# Patient Record
Sex: FEMALE | Race: BLACK OR AFRICAN AMERICAN | NOT HISPANIC OR LATINO | URBAN - METROPOLITAN AREA
[De-identification: names, ages, dates, MRNs, and addresses within clinical notes are randomized per-mention and may not be internally consistent; named-entity substitution may affect disease eponyms.]

---

## 2020-12-07 ENCOUNTER — CLINICAL SUPPORT (OUTPATIENT)
Dept: SMOKING CESSATION | Facility: CLINIC | Age: 50
End: 2020-12-07
Attending: SURGERY
Payer: COMMERCIAL

## 2020-12-07 DIAGNOSIS — F17.200 NICOTINE DEPENDENCE: Primary | ICD-10-CM

## 2020-12-07 PROCEDURE — 99404 PREV MED CNSL INDIV APPRX 60: CPT | Mod: S$GLB,,, | Performed by: GENERAL PRACTICE

## 2020-12-07 PROCEDURE — 99404 PR PREVENT COUNSEL,INDIV,60 MIN: ICD-10-PCS | Mod: S$GLB,,, | Performed by: GENERAL PRACTICE

## 2020-12-08 RX ORDER — AMLODIPINE BESYLATE 2.5 MG/1
2.5 TABLET ORAL DAILY
COMMUNITY

## 2020-12-08 RX ORDER — IBUPROFEN 200 MG
1 TABLET ORAL DAILY
Qty: 14 PATCH | Refills: 1 | Status: SHIPPED | OUTPATIENT
Start: 2020-12-08 | End: 2020-12-22 | Stop reason: SDUPTHER

## 2020-12-08 RX ORDER — DM/P-EPHED/ACETAMINOPH/DOXYLAM 30-7.5/3
2 LIQUID (ML) ORAL
Qty: 216 LOZENGE | Refills: 0 | Status: SHIPPED | OUTPATIENT
Start: 2020-12-08 | End: 2021-01-07

## 2020-12-15 ENCOUNTER — CLINICAL SUPPORT (OUTPATIENT)
Dept: SMOKING CESSATION | Facility: CLINIC | Age: 50
End: 2020-12-15
Payer: COMMERCIAL

## 2020-12-15 DIAGNOSIS — F17.200 NICOTINE DEPENDENCE: Primary | ICD-10-CM

## 2020-12-15 PROCEDURE — 99402 PREV MED CNSL INDIV APPRX 30: CPT | Mod: S$GLB,,, | Performed by: GENERAL PRACTICE

## 2020-12-15 PROCEDURE — 99402 PR PREVENT COUNSEL,INDIV,30 MIN: ICD-10-PCS | Mod: S$GLB,,, | Performed by: GENERAL PRACTICE

## 2020-12-15 NOTE — Clinical Note
Spoke with patient at length regarding tobacco cessation progress. Patient remains on prescribed tobacco cessation medication regimen of 21 mg patch and 2mg lozenges without any negative side effects at this time. Patient stated that she smokes  one cigarette a day and that because her fiance friend gives it to her. Reviewed strategies, cues, and triggers. Introduced the negative impact of tobacco on health, the health advantages of discontinuing the use of tobacco, time line improved health changes after a quit, withdrawal issues to expect from nicotine and habit, and ways to achieve the goal of a quit. Triggers are coffee, after eating and alcohol. The patient will continue with group therapy sessions and medication monitoring by CTTS. Prescribed medication management will be by physician. The patient denies any abnormal behavioral or mental changes at this time.

## 2020-12-15 NOTE — PROGRESS NOTES
Individual Follow-Up Form    12/15/2020    Quit Date: none    Clinical Status of Patient: Outpatient    Length of Service: 30 minutes    Continuing Medication: yes  Patches    Other Medications: 2mg lozenges     Target Symptoms: Withdrawal and medication side effects. The following were rated moderate (3) to severe (4) on TCRS:  · Moderate (3): none  · Severe (4): none    Comments: Spoke with patient at length regarding tobacco cessation progress. Patient remains on prescribed tobacco cessation medication regimen of 21 mg patch and 2mg lozenges without any negative side effects at this time. Patient stated that she smokes  one cigarette a day and thats because her fiance friend gives it to her. Reviewed strategies, cues, and triggers. Introduced the negative impact of tobacco on health, the health advantages of discontinuing the use of tobacco, time line improved health changes after a quit, withdrawal issues to expect from nicotine and habit, and ways to achieve the goal of a quit. Triggers are coffee, after eating and alcohol. The patient will continue with group therapy sessions and medication monitoring by CTTS. Prescribed medication management will be by physician. The patient denies any abnormal behavioral or mental changes at this time.       Diagnosis: F17.210    Next Visit: 1 week

## 2020-12-22 ENCOUNTER — CLINICAL SUPPORT (OUTPATIENT)
Dept: SMOKING CESSATION | Facility: CLINIC | Age: 50
End: 2020-12-22
Payer: COMMERCIAL

## 2020-12-22 DIAGNOSIS — F17.200 NICOTINE DEPENDENCE: ICD-10-CM

## 2020-12-22 DIAGNOSIS — Z72.0 TOBACCO ABUSE: ICD-10-CM

## 2020-12-22 PROCEDURE — 99402 PR PREVENT COUNSEL,INDIV,30 MIN: ICD-10-PCS | Mod: S$GLB,,, | Performed by: GENERAL PRACTICE

## 2020-12-22 PROCEDURE — 99402 PREV MED CNSL INDIV APPRX 30: CPT | Mod: S$GLB,,, | Performed by: GENERAL PRACTICE

## 2020-12-22 RX ORDER — IBUPROFEN 200 MG
1 TABLET ORAL DAILY
Qty: 14 PATCH | Refills: 1 | Status: SHIPPED | OUTPATIENT
Start: 2020-12-22 | End: 2021-01-19

## 2020-12-22 NOTE — PROGRESS NOTES
Individual Follow-Up Form    12/22/2020    Quit Date: 12/19/20     Clinical Status of Patient: Outpatient    Length of Service: 30 minutes    Continuing Medication: yes  Patches    Other Medications: 2mg lozenges     Target Symptoms: Withdrawal and medication side effects. The following were  rated moderate (3) to severe (4) on TCRS:  · Moderate (3): none  · Severe (4): none    Comments: Spoke with patient at length via phone. Patient remains on prescribed tobacco cessation medication regimen of 21 mg patch and 2mg lozenges without any negative side effects at this time. Patient informed me that she has not smoked since Friday December 18th. Her quit date is Saturday December 19 th. She is super proud of herself. I commend her on not smoking. Patient stated that she quit drinking alcohol because that was a strong trigger. Reviewed strategies, habitual behavior, stress, and high risk situations. Introduced stress with addition interventions, SOLVE, relaxation with interventions, nutrition, exercise, weight gain, and the importance of rewarding oneself for accomplishments toward becoming tobacco free. Open discussion of all items with interventions. The patient will continue with individual therapy sessions and medication monitoring by CTTS. Prescribed medication management will be by physician. The patient denies any abnormal behavioral or mental changes at this time.         Diagnosis: F17.210    Next Visit: 1 week

## 2020-12-22 NOTE — Clinical Note
Spoke with patient at length via phone. Patient remains on prescribed tobacco cessation medication regimen of 21 mg patch and 2mg lozenges without any negative side effects at this time. Patient informed me that she has not smoked since Friday December 18th. Her quit date is Saturday December 19 th. She is super proud of herself. I commend her on not smoking. Patient stated that she quit drinking alcohol because that was a strong trigger. Reviewed strategies, habitual behavior, stress, and high risk situations. Introduced stress with addition interventions, SOLVE, relaxation with interventions, nutrition, exercise, weight gain, and the importance of rewarding oneself for accomplishments toward becoming tobacco free. Open discussion of all items with interventions. The patient will continue with individual therapy sessions and medication monitoring by CTTS. Prescribed medication management will be by physician. The patient denies any abnormal behavioral or mental changes at this time.

## 2020-12-30 ENCOUNTER — CLINICAL SUPPORT (OUTPATIENT)
Dept: SMOKING CESSATION | Facility: CLINIC | Age: 50
End: 2020-12-30
Payer: COMMERCIAL

## 2020-12-30 DIAGNOSIS — F17.200 NICOTINE DEPENDENCE: Primary | ICD-10-CM

## 2020-12-30 PROCEDURE — 99402 PREV MED CNSL INDIV APPRX 30: CPT | Mod: S$GLB,,, | Performed by: GENERAL PRACTICE

## 2020-12-30 PROCEDURE — 99402 PR PREVENT COUNSEL,INDIV,30 MIN: ICD-10-PCS | Mod: S$GLB,,, | Performed by: GENERAL PRACTICE

## 2020-12-30 NOTE — Clinical Note
Spoke with patient at length via phone regarding tobacco cessation progress. Patient remains on prescribed tobacco cessation medication regimen of 21 mg patch and 2mg lozenge without any negative side effects at this time. Patient remains tobacco free since 12/19/2020. The patient is super proud and taking many precautions to not slip and relapse. She runs and gets herself out of high risks situations. She hopes to reward herself with new running shoes soon. Reviewed strategies, habitual behavior, high risks situations, understanding urges and cravings, stress and relaxation with open discussion and additional interventions, Introduced lapses, relapses, understanding them and analyzing the situation of a lapse, conflict issues that may be linked to a lapse. The patient will continue with individual therapy sessions and medication monitoring by CTTS. Prescribed medication management will be by physician. The patient denies any abnormal behavioral or mental changes at this time.

## 2020-12-30 NOTE — PROGRESS NOTES
Individual Follow-Up Form    12/30/2020    Quit Date: 12/19/2020    Clinical Status of Patient: Outpatient    Length of Service: 30 minutes    Continuing Medication: yes  Patches or Nicotine gum    Other Medications: none     Target Symptoms: Withdrawal and medication side effects. The following were  rated moderate (3) to severe (4) on TCRS:  · Moderate (3): none  · Severe (4): none    Comments: Spoke with patient at length via phone regarding tobacco cessation progress. Patient remains on prescribed tobacco cessation medication regimen of 21 mg patch and 2mg lozenge without any negative side effects at this time. Patient remains tobacco free since 12/19/2020. The patient is super proud and taking many precautions to not slip and relapse. She runs and gets herself out of high risks situations and has a cat that she gives a lot of attention to. She also mentioned that she loves the way her clothes smell now that she does not smoke. She hopes to reward herself with new running shoes soon. Reviewed strategies, habitual behavior, high risks situations, understanding urges and cravings, stress and relaxation with open discussion and additional interventions, Introduced lapses, relapses, understanding them and analyzing the situation of a lapse, conflict issues that may be linked to a lapse. The patient will continue with individual therapy sessions and medication monitoring by CTTS. Prescribed medication management will be by physician. The patient denies any abnormal behavioral or mental changes at this time.         Diagnosis: F17.210    Next Visit: 2 weeks

## 2021-01-20 ENCOUNTER — TELEPHONE (OUTPATIENT)
Dept: SMOKING CESSATION | Facility: CLINIC | Age: 51
End: 2021-01-20

## 2021-01-21 ENCOUNTER — TELEPHONE (OUTPATIENT)
Dept: SMOKING CESSATION | Facility: CLINIC | Age: 51
End: 2021-01-21

## 2021-01-21 ENCOUNTER — CLINICAL SUPPORT (OUTPATIENT)
Dept: SMOKING CESSATION | Facility: CLINIC | Age: 51
End: 2021-01-21
Payer: COMMERCIAL

## 2021-01-21 DIAGNOSIS — F17.200 NICOTINE DEPENDENCE: Primary | ICD-10-CM

## 2021-01-21 PROCEDURE — 99402 PR PREVENT COUNSEL,INDIV,30 MIN: ICD-10-PCS | Mod: S$GLB,,, | Performed by: GENERAL PRACTICE

## 2021-01-21 PROCEDURE — 99402 PREV MED CNSL INDIV APPRX 30: CPT | Mod: S$GLB,,, | Performed by: GENERAL PRACTICE
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
## 2021-01-22 PROBLEM — S02.2XXD CLOSED FRACTURE OF NASAL BONE WITH ROUTINE HEALING: Status: ACTIVE | Noted: 2021-01-22

## 2021-01-22 PROBLEM — S01.81XA LACERATION OF FACE: Status: ACTIVE | Noted: 2021-01-22

## 2021-02-11 ENCOUNTER — CLINICAL SUPPORT (OUTPATIENT)
Dept: SMOKING CESSATION | Facility: CLINIC | Age: 51
End: 2021-02-11
Payer: COMMERCIAL

## 2021-02-11 DIAGNOSIS — F17.200 NICOTINE DEPENDENCE: Primary | ICD-10-CM

## 2021-02-11 PROCEDURE — 99402 PR PREVENT COUNSEL,INDIV,30 MIN: ICD-10-PCS | Mod: S$GLB,,, | Performed by: GENERAL PRACTICE

## 2021-02-11 PROCEDURE — 99402 PREV MED CNSL INDIV APPRX 30: CPT | Mod: S$GLB,,, | Performed by: GENERAL PRACTICE

## 2021-03-04 ENCOUNTER — TELEPHONE (OUTPATIENT)
Dept: SMOKING CESSATION | Facility: CLINIC | Age: 51
End: 2021-03-04

## 2021-03-09 ENCOUNTER — TELEPHONE (OUTPATIENT)
Dept: SMOKING CESSATION | Facility: CLINIC | Age: 51
End: 2021-03-09

## 2021-03-10 ENCOUNTER — TELEPHONE (OUTPATIENT)
Dept: SMOKING CESSATION | Facility: CLINIC | Age: 51
End: 2021-03-10

## 2021-03-11 ENCOUNTER — CLINICAL SUPPORT (OUTPATIENT)
Dept: SMOKING CESSATION | Facility: CLINIC | Age: 51
End: 2021-03-11
Payer: COMMERCIAL

## 2021-03-11 ENCOUNTER — TELEPHONE (OUTPATIENT)
Dept: SMOKING CESSATION | Facility: CLINIC | Age: 51
End: 2021-03-11

## 2021-03-11 DIAGNOSIS — F17.200 NICOTINE DEPENDENCE: Primary | ICD-10-CM

## 2021-03-11 PROCEDURE — 99402 PR PREVENT COUNSEL,INDIV,30 MIN: ICD-10-PCS | Mod: S$GLB,,, | Performed by: GENERAL PRACTICE

## 2021-03-11 PROCEDURE — 99402 PREV MED CNSL INDIV APPRX 30: CPT | Mod: S$GLB,,, | Performed by: GENERAL PRACTICE

## 2021-04-01 ENCOUNTER — CLINICAL SUPPORT (OUTPATIENT)
Dept: SMOKING CESSATION | Facility: CLINIC | Age: 51
End: 2021-04-01
Payer: COMMERCIAL

## 2021-04-01 DIAGNOSIS — F17.200 NICOTINE DEPENDENCE: Primary | ICD-10-CM

## 2021-04-01 PROCEDURE — 99402 PREV MED CNSL INDIV APPRX 30: CPT | Mod: S$GLB,,, | Performed by: GENERAL PRACTICE

## 2021-04-01 PROCEDURE — 99402 PR PREVENT COUNSEL,INDIV,30 MIN: ICD-10-PCS | Mod: S$GLB,,, | Performed by: GENERAL PRACTICE

## 2021-04-01 RX ORDER — DM/P-EPHED/ACETAMINOPH/DOXYLAM 30-7.5/3
2 LIQUID (ML) ORAL
Qty: 270 LOZENGE | Refills: 0 | Status: SHIPPED | OUTPATIENT
Start: 2021-04-01 | End: 2021-07-21 | Stop reason: SDUPTHER

## 2021-04-22 ENCOUNTER — TELEPHONE (OUTPATIENT)
Dept: SMOKING CESSATION | Facility: CLINIC | Age: 51
End: 2021-04-22

## 2021-04-28 ENCOUNTER — CLINICAL SUPPORT (OUTPATIENT)
Dept: SMOKING CESSATION | Facility: CLINIC | Age: 51
End: 2021-04-28
Payer: COMMERCIAL

## 2021-04-28 ENCOUNTER — TELEPHONE (OUTPATIENT)
Dept: SMOKING CESSATION | Facility: CLINIC | Age: 51
End: 2021-04-28

## 2021-04-28 DIAGNOSIS — F17.200 NICOTINE DEPENDENCE: Primary | ICD-10-CM

## 2021-04-28 PROCEDURE — 99401 PREV MED CNSL INDIV APPRX 15: CPT | Mod: S$GLB,,, | Performed by: GENERAL PRACTICE

## 2021-04-28 PROCEDURE — 99401 PR PREVENT COUNSEL,INDIV,15 MIN: ICD-10-PCS | Mod: S$GLB,,, | Performed by: GENERAL PRACTICE

## 2021-05-26 ENCOUNTER — CLINICAL SUPPORT (OUTPATIENT)
Dept: SMOKING CESSATION | Facility: CLINIC | Age: 51
End: 2021-05-26
Payer: COMMERCIAL

## 2021-05-26 ENCOUNTER — TELEPHONE (OUTPATIENT)
Dept: SMOKING CESSATION | Facility: CLINIC | Age: 51
End: 2021-05-26

## 2021-05-26 DIAGNOSIS — F17.200 NICOTINE DEPENDENCE: Primary | ICD-10-CM

## 2021-05-26 PROCEDURE — 99402 PREV MED CNSL INDIV APPRX 30: CPT | Mod: S$GLB,,, | Performed by: GENERAL PRACTICE

## 2021-05-26 PROCEDURE — 99402 PR PREVENT COUNSEL,INDIV,30 MIN: ICD-10-PCS | Mod: S$GLB,,, | Performed by: GENERAL PRACTICE

## 2021-05-26 RX ORDER — IBUPROFEN 200 MG
1 TABLET ORAL DAILY
Qty: 14 PATCH | Refills: 0 | Status: SHIPPED | OUTPATIENT
Start: 2021-05-26 | End: 2022-01-05

## 2021-06-02 ENCOUNTER — TELEPHONE (OUTPATIENT)
Dept: SMOKING CESSATION | Facility: CLINIC | Age: 51
End: 2021-06-02

## 2021-06-08 ENCOUNTER — TELEPHONE (OUTPATIENT)
Dept: SMOKING CESSATION | Facility: CLINIC | Age: 51
End: 2021-06-08

## 2021-06-15 ENCOUNTER — TELEPHONE (OUTPATIENT)
Dept: SMOKING CESSATION | Facility: CLINIC | Age: 51
End: 2021-06-15

## 2021-06-15 ENCOUNTER — CLINICAL SUPPORT (OUTPATIENT)
Dept: SMOKING CESSATION | Facility: CLINIC | Age: 51
End: 2021-06-15
Payer: COMMERCIAL

## 2021-06-15 DIAGNOSIS — F17.200 NICOTINE DEPENDENCE: Primary | ICD-10-CM

## 2021-06-16 ENCOUNTER — CLINICAL SUPPORT (OUTPATIENT)
Dept: SMOKING CESSATION | Facility: CLINIC | Age: 51
End: 2021-06-16
Payer: COMMERCIAL

## 2021-06-16 DIAGNOSIS — F17.200 NICOTINE DEPENDENCE: Primary | ICD-10-CM

## 2021-06-16 PROCEDURE — 99402 PR PREVENT COUNSEL,INDIV,30 MIN: ICD-10-PCS | Mod: S$GLB,,, | Performed by: GENERAL PRACTICE

## 2021-06-16 PROCEDURE — 99402 PREV MED CNSL INDIV APPRX 30: CPT | Mod: S$GLB,,, | Performed by: GENERAL PRACTICE

## 2021-06-30 ENCOUNTER — CLINICAL SUPPORT (OUTPATIENT)
Dept: SMOKING CESSATION | Facility: CLINIC | Age: 51
End: 2021-06-30
Payer: COMMERCIAL

## 2021-06-30 ENCOUNTER — TELEPHONE (OUTPATIENT)
Dept: SMOKING CESSATION | Facility: CLINIC | Age: 51
End: 2021-06-30

## 2021-06-30 DIAGNOSIS — F17.200 NICOTINE DEPENDENCE: Primary | ICD-10-CM

## 2021-06-30 PROCEDURE — 99402 PREV MED CNSL INDIV APPRX 30: CPT | Mod: S$GLB,,, | Performed by: GENERAL PRACTICE

## 2021-06-30 PROCEDURE — 99402 PR PREVENT COUNSEL,INDIV,30 MIN: ICD-10-PCS | Mod: S$GLB,,, | Performed by: GENERAL PRACTICE

## 2021-07-01 ENCOUNTER — PATIENT MESSAGE (OUTPATIENT)
Dept: ADMINISTRATIVE | Facility: OTHER | Age: 51
End: 2021-07-01

## 2021-07-15 ENCOUNTER — TELEPHONE (OUTPATIENT)
Dept: SMOKING CESSATION | Facility: CLINIC | Age: 51
End: 2021-07-15

## 2021-07-21 ENCOUNTER — CLINICAL SUPPORT (OUTPATIENT)
Dept: SMOKING CESSATION | Facility: CLINIC | Age: 51
End: 2021-07-21
Payer: COMMERCIAL

## 2021-07-21 ENCOUNTER — TELEPHONE (OUTPATIENT)
Dept: SMOKING CESSATION | Facility: CLINIC | Age: 51
End: 2021-07-21

## 2021-07-21 DIAGNOSIS — F17.200 NICOTINE DEPENDENCE: ICD-10-CM

## 2021-07-21 PROCEDURE — 99402 PREV MED CNSL INDIV APPRX 30: CPT | Mod: S$GLB,,, | Performed by: GENERAL PRACTICE

## 2021-07-21 PROCEDURE — 99402 PR PREVENT COUNSEL,INDIV,30 MIN: ICD-10-PCS | Mod: S$GLB,,, | Performed by: GENERAL PRACTICE

## 2021-07-21 RX ORDER — DM/P-EPHED/ACETAMINOPH/DOXYLAM 30-7.5/3
2 LIQUID (ML) ORAL
Qty: 270 LOZENGE | Refills: 0 | Status: SHIPPED | OUTPATIENT
Start: 2021-07-21 | End: 2022-01-05 | Stop reason: SDUPTHER

## 2021-07-29 ENCOUNTER — TELEPHONE (OUTPATIENT)
Dept: SMOKING CESSATION | Facility: CLINIC | Age: 51
End: 2021-07-29

## 2021-08-04 ENCOUNTER — TELEPHONE (OUTPATIENT)
Dept: SMOKING CESSATION | Facility: CLINIC | Age: 51
End: 2021-08-04

## 2021-08-04 ENCOUNTER — CLINICAL SUPPORT (OUTPATIENT)
Dept: SMOKING CESSATION | Facility: CLINIC | Age: 51
End: 2021-08-04
Payer: COMMERCIAL

## 2021-08-04 DIAGNOSIS — F17.200 NICOTINE DEPENDENCE: Primary | ICD-10-CM

## 2021-08-04 PROCEDURE — 99402 PR PREVENT COUNSEL,INDIV,30 MIN: ICD-10-PCS | Mod: S$GLB,,, | Performed by: GENERAL PRACTICE

## 2021-08-04 PROCEDURE — 99402 PREV MED CNSL INDIV APPRX 30: CPT | Mod: S$GLB,,, | Performed by: GENERAL PRACTICE

## 2021-08-18 ENCOUNTER — CLINICAL SUPPORT (OUTPATIENT)
Dept: SMOKING CESSATION | Facility: CLINIC | Age: 51
End: 2021-08-18
Payer: COMMERCIAL

## 2021-08-18 DIAGNOSIS — F17.200 NICOTINE DEPENDENCE: Primary | ICD-10-CM

## 2021-08-18 PROCEDURE — 99402 PREV MED CNSL INDIV APPRX 30: CPT | Mod: S$GLB,,, | Performed by: GENERAL PRACTICE

## 2021-08-18 PROCEDURE — 99402 PR PREVENT COUNSEL,INDIV,30 MIN: ICD-10-PCS | Mod: S$GLB,,, | Performed by: GENERAL PRACTICE

## 2021-12-23 ENCOUNTER — CLINICAL SUPPORT (OUTPATIENT)
Dept: SMOKING CESSATION | Facility: CLINIC | Age: 51
End: 2021-12-23
Payer: COMMERCIAL

## 2021-12-23 DIAGNOSIS — F17.200 NICOTINE DEPENDENCE: Primary | ICD-10-CM

## 2021-12-23 PROCEDURE — 99407 BEHAV CHNG SMOKING > 10 MIN: CPT | Mod: S$GLB,,,

## 2021-12-23 PROCEDURE — 99407 PR TOBACCO USE CESSATION INTENSIVE >10 MINUTES: ICD-10-PCS | Mod: S$GLB,,,

## 2022-01-05 ENCOUNTER — CLINICAL SUPPORT (OUTPATIENT)
Dept: SMOKING CESSATION | Facility: CLINIC | Age: 52
End: 2022-01-05
Payer: COMMERCIAL

## 2022-01-05 ENCOUNTER — TELEPHONE (OUTPATIENT)
Dept: SMOKING CESSATION | Facility: CLINIC | Age: 52
End: 2022-01-05

## 2022-01-05 DIAGNOSIS — F17.200 NICOTINE DEPENDENCE: ICD-10-CM

## 2022-01-05 PROCEDURE — 99404 PR PREVENT COUNSEL,INDIV,60 MIN: ICD-10-PCS | Mod: S$GLB,,, | Performed by: GENERAL PRACTICE

## 2022-01-05 PROCEDURE — 99404 PREV MED CNSL INDIV APPRX 60: CPT | Mod: S$GLB,,, | Performed by: GENERAL PRACTICE

## 2022-01-05 RX ORDER — DM/P-EPHED/ACETAMINOPH/DOXYLAM 30-7.5/3
2 LIQUID (ML) ORAL
Qty: 270 LOZENGE | Refills: 0 | Status: SHIPPED | OUTPATIENT
Start: 2022-01-05 | End: 2022-02-10 | Stop reason: SDUPTHER

## 2022-01-05 RX ORDER — IBUPROFEN 200 MG
1 TABLET ORAL DAILY
Qty: 14 PATCH | Refills: 0 | Status: SHIPPED | OUTPATIENT
Start: 2022-01-05 | End: 2022-02-10 | Stop reason: DRUGHIGH

## 2022-01-05 RX ORDER — VARENICLINE TARTRATE 1 MG/1
1 TABLET, FILM COATED ORAL 2 TIMES DAILY
Qty: 60 TABLET | Refills: 0 | Status: SHIPPED | OUTPATIENT
Start: 2022-01-05

## 2022-01-05 NOTE — PROGRESS NOTES
Patient will be participating in weekly tobacco cessation meetings and will begin the prescribed tobacco cessation medication 21 mg patches, 2mg lozenges and Chantix. FTND of 4 indicates a low level of dependence to tobacco. YANETH-D of 17 is perceived as some mental distress/ depression. Patient smokes 4 small cigars a day.

## 2022-01-12 ENCOUNTER — CLINICAL SUPPORT (OUTPATIENT)
Dept: SMOKING CESSATION | Facility: CLINIC | Age: 52
End: 2022-01-12
Payer: COMMERCIAL

## 2022-01-12 DIAGNOSIS — F17.200 NICOTINE DEPENDENCE: Primary | ICD-10-CM

## 2022-01-12 PROCEDURE — 99402 PREV MED CNSL INDIV APPRX 30: CPT | Mod: S$GLB,,, | Performed by: GENERAL PRACTICE

## 2022-01-12 PROCEDURE — 99402 PR PREVENT COUNSEL,INDIV,30 MIN: ICD-10-PCS | Mod: S$GLB,,, | Performed by: GENERAL PRACTICE

## 2022-01-12 NOTE — Clinical Note
Spoke with patient at length regarding tobacco cessation cessation progress. Patient remains on prescribed tobacco cessation medication regimen of 21 mg patch and 2mg lozenge (8) without any negative side effects at this time. Patient stated the patches and lozenges are doing her well. Patient informed me that she quit smoking the day of our last session. Her quit date is 1/6/22. I commended her on quitting. Patient stated that she wanted to quit smoking to save money, help her breathing, and she hates to smell like smoke. She keeps busy by feeding the animals, hunting , fishing and she walks 3 miles a day.  Reviewed learned addiction model, cues/triggers, personal reasons for quitting, medications, goals, quit date. Patient remains on prescribed tobacco cessation medication regimen of 21 mg patch without any negative side effects at this time. The patient denies any abnormal behavioral or mental changes at this time.

## 2022-01-12 NOTE — PROGRESS NOTES
Individual Follow-Up Form    1/12/2022    Quit Date: 1/6/22    Clinical Status of Patient: Outpatient    Length of Service: 30 minutes    Continuing Medication: yes  Patches or Nicotine Lozenges    Other Medications: none     Target Symptoms: Withdrawal and medication side effects. The following were rated moderate (3) to severe (4) on TCRS:  · Moderate (3): none  · Severe (4): none    Comments: Spoke with patient at length regarding tobacco cessation cessation progress. Patient remains on prescribed tobacco cessation medication regimen of 21 mg patch and 2mg lozenge (8) without any negative side effects at this time. Patient stated the patches and lozenges are doing her well. Patient informed me that she quit smoking the day of our last session. Her quit date is 1/6/22. I commended her on quitting. Patient stated that she wanted to quit smoking to save money, help her breathing, and she hates to smell like smoke. She keeps busy by feeding the animals, hunting , fishing and she walks 3 miles a day.  Reviewed learned addiction model, cues/triggers, personal reasons for quitting, medications, goals, quit date. Patient remains on prescribed tobacco cessation medication regimen of 21 mg patch without any negative side effects at this time. The patient denies any abnormal behavioral or mental changes at this time.     Diagnosis: F17.200    Next Visit: 2 weeks

## 2022-01-26 ENCOUNTER — TELEPHONE (OUTPATIENT)
Dept: SMOKING CESSATION | Facility: CLINIC | Age: 52
End: 2022-01-26

## 2022-01-26 NOTE — TELEPHONE ENCOUNTER
Attempted to follow up with patient regarding tobacco cessation progress. Patient did not answer. Mailbox is full.

## 2022-01-27 ENCOUNTER — TELEPHONE (OUTPATIENT)
Dept: SMOKING CESSATION | Facility: CLINIC | Age: 52
End: 2022-01-27
Payer: COMMERCIAL

## 2022-01-27 ENCOUNTER — CLINICAL SUPPORT (OUTPATIENT)
Dept: SMOKING CESSATION | Facility: CLINIC | Age: 52
End: 2022-01-27
Payer: COMMERCIAL

## 2022-01-27 DIAGNOSIS — F17.200 NICOTINE DEPENDENCE: Primary | ICD-10-CM

## 2022-01-27 PROCEDURE — 99402 PREV MED CNSL INDIV APPRX 30: CPT | Mod: S$GLB,,, | Performed by: GENERAL PRACTICE

## 2022-01-27 PROCEDURE — 99402 PR PREVENT COUNSEL,INDIV,30 MIN: ICD-10-PCS | Mod: S$GLB,,, | Performed by: GENERAL PRACTICE
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
## 2022-02-10 ENCOUNTER — CLINICAL SUPPORT (OUTPATIENT)
Dept: SMOKING CESSATION | Facility: CLINIC | Age: 52
End: 2022-02-10
Payer: COMMERCIAL

## 2022-02-10 DIAGNOSIS — F17.200 NICOTINE DEPENDENCE: Primary | ICD-10-CM

## 2022-02-10 PROCEDURE — 99402 PR PREVENT COUNSEL,INDIV,30 MIN: ICD-10-PCS | Mod: S$GLB,,, | Performed by: GENERAL PRACTICE

## 2022-02-10 PROCEDURE — 99402 PREV MED CNSL INDIV APPRX 30: CPT | Mod: S$GLB,,, | Performed by: GENERAL PRACTICE

## 2022-02-10 RX ORDER — IBUPROFEN 200 MG
1 TABLET ORAL DAILY
Qty: 28 PATCH | Refills: 0 | Status: SHIPPED | OUTPATIENT
Start: 2022-02-10 | End: 2022-03-31

## 2022-02-10 RX ORDER — DM/P-EPHED/ACETAMINOPH/DOXYLAM 30-7.5/3
2 LIQUID (ML) ORAL
Qty: 288 LOZENGE | Refills: 0 | Status: SHIPPED | OUTPATIENT
Start: 2022-02-10 | End: 2022-03-31

## 2022-02-10 NOTE — PROGRESS NOTES
Individual Follow-Up Form    2/10/2022    Quit Date: 1/27/22    Clinical Status of Patient: Outpatient    Length of Service: 30 minutes    Continuing Medication: yes  Patches or Nicotine Lozenges    Other Medications: none     Target Symptoms: Withdrawal and medication side effects. The following were rated moderate (3) to severe (4) on TCRS:  · Moderate (3): none  · Severe (4): none    Comments: Spoke with patient at length regarding tobacco cessation cessation progress. Patient remains on prescribed tobacco cessation medication regimen of  21mg patch and 2mg lozenges without any side effects at this time. Refilled 2mg lozenges and decrease to 14mg patches. Patient informed me that she has not smoked since 1/26/22. Her quit date was 1/27/22. I commended her on being tobacco free. Patient stated that she is doing better from her stroke and that she was scared from it and doing whatever she can to help her overall health. 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 She is also riding her bike a hour every morning. Reviewed strategies, habitual behavior, stress, and high risk situations. Introduced stress with addition interventions, relaxation with interventions, nutrition, exercise, weight gain, and the importance of rewarding oneself for accomplishments toward becoming tobacco free.The patient will continue with  therapy sessions and medication monitoring by CTTS. Prescribed medication management will be by physician. The patient denies any abnormal behavioral or mental changes at this time.         Diagnosis: F17.200    Next Visit: 2 weeks

## 2022-02-10 NOTE — Clinical Note
Spoke with patient at length regarding tobacco cessation cessation progress. Patient remains on prescribed tobacco cessation medication regimen of  21mg patch and 2mg lozenges without any side effects at this time. Refilled 2mg lozenges and decrease to 14mg patches.  Her quit date was 1/27/22. I commended her on being tobacco free. Patient stated that she is doing better from her stroke and that she was scared from it and doing whatever she can to help her overall health. She is also riding her bike a hour every morning. Reviewed strategies, habitual behavior, stress, and high risk situations. Introduced stress with addition interventions, relaxation with interventions, nutrition, exercise, weight gain, and the importance of rewarding oneself for accomplishments toward becoming tobacco free.The patient will continue with  therapy sessions and medication monitoring by CTTS. Prescribed medication management will be by physician. The patient denies any abnormal behavioral or mental changes at this time.

## 2022-02-24 ENCOUNTER — TELEPHONE (OUTPATIENT)
Dept: SMOKING CESSATION | Facility: CLINIC | Age: 52
End: 2022-02-24

## 2022-02-24 ENCOUNTER — CLINICAL SUPPORT (OUTPATIENT)
Dept: SMOKING CESSATION | Facility: CLINIC | Age: 52
End: 2022-02-24
Payer: COMMERCIAL

## 2022-02-24 DIAGNOSIS — F17.200 NICOTINE DEPENDENCE: Primary | ICD-10-CM

## 2022-02-24 PROCEDURE — 99402 PREV MED CNSL INDIV APPRX 30: CPT | Mod: S$GLB,,, | Performed by: GENERAL PRACTICE

## 2022-02-24 PROCEDURE — 99402 PR PREVENT COUNSEL,INDIV,30 MIN: ICD-10-PCS | Mod: S$GLB,,, | Performed by: GENERAL PRACTICE
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
## 2022-03-10 ENCOUNTER — TELEPHONE (OUTPATIENT)
Dept: SMOKING CESSATION | Facility: CLINIC | Age: 52
End: 2022-03-10

## 2022-03-10 NOTE — TELEPHONE ENCOUNTER
Attempted to follow up with patient regarding tobacco cessation progress. Patient did not answer. The voicemail box is full.

## 2022-03-17 ENCOUNTER — TELEPHONE (OUTPATIENT)
Dept: SMOKING CESSATION | Facility: CLINIC | Age: 52
End: 2022-03-17
Payer: COMMERCIAL

## 2022-03-17 ENCOUNTER — CLINICAL SUPPORT (OUTPATIENT)
Dept: SMOKING CESSATION | Facility: CLINIC | Age: 52
End: 2022-03-17
Payer: COMMERCIAL

## 2022-03-17 DIAGNOSIS — F17.200 NICOTINE DEPENDENCE: Primary | ICD-10-CM

## 2022-03-17 PROCEDURE — 99402 PREV MED CNSL INDIV APPRX 30: CPT | Mod: S$GLB,,, | Performed by: GENERAL PRACTICE

## 2022-03-17 PROCEDURE — 99402 PR PREVENT COUNSEL,INDIV,30 MIN: ICD-10-PCS | Mod: S$GLB,,, | Performed by: GENERAL PRACTICE

## 2022-03-17 NOTE — Clinical Note
Spoke with patient at length regarding tobacco cessation cessation progress. Patient remains on prescribed tobacco cessation medication regimen of  14 mg patch and 2mg (PRN) lozenges without any side effects at this time. Patient does not wear the patches every day. No refills needed at this time. Patient informed me that she remains quit as of 1/27/22. I commended her on remaining tobacco free. Patient says that she is doing well and getting stronger every day. She does not have craving or the urge because she is never around smokers. She exercises and stay busy with the animals. The patient will continue with  therapy sessions and medication monitoring by CTTS. Prescribed medication management will be by physician. The patient denies any abnormal behavioral or mental changes at this time.

## 2022-03-17 NOTE — PROGRESS NOTES
Individual Follow-Up Form    3/17/2022    Quit Date: 1/27/22    Clinical Status of Patient: Outpatient    Length of Service: 30 minutes    Continuing Medication: yes  Patches or Nicotine Lozenges    Other Medications: chantix no     Target Symptoms: Withdrawal and medication side effects. The following were rated moderate (3) to severe (4) on TCRS:  · Moderate (3): none  · Severe (4): none    Comments: Spoke with patient at length regarding tobacco cessation cessation progress. Patient remains on prescribed tobacco cessation medication regimen of  14 mg patch and 2mg (PRN) lozenges without any side effects at this time. Patient does not wear the patches every day. No refills needed at this time. Patient informed me that she remains quit as of 1/27/22. I commended her on remaining tobacco free. Patient says that she is doing well and getting stronger every day. She does not have craving or the urge because she is never around smokers. She exercises and stay busy with the animals. The patient will continue with  therapy sessions and medication monitoring by CTTS. Prescribed medication management will be by physician. The patient denies any abnormal behavioral or mental changes at this time.     Diagnosis: F17.200    Next Visit: 2 weeks

## 2022-03-31 ENCOUNTER — CLINICAL SUPPORT (OUTPATIENT)
Dept: SMOKING CESSATION | Facility: CLINIC | Age: 52
End: 2022-03-31
Payer: COMMERCIAL

## 2022-03-31 ENCOUNTER — TELEPHONE (OUTPATIENT)
Dept: SMOKING CESSATION | Facility: CLINIC | Age: 52
End: 2022-03-31

## 2022-03-31 DIAGNOSIS — F17.200 NICOTINE DEPENDENCE: Primary | ICD-10-CM

## 2022-03-31 PROCEDURE — 99402 PR PREVENT COUNSEL,INDIV,30 MIN: ICD-10-PCS | Mod: S$GLB,,, | Performed by: GENERAL PRACTICE

## 2022-03-31 PROCEDURE — 99402 PREV MED CNSL INDIV APPRX 30: CPT | Mod: S$GLB,,, | Performed by: GENERAL PRACTICE

## 2022-03-31 NOTE — TELEPHONE ENCOUNTER
Attempted to follow up with patient regarding tobacco cessation progress. Patient did not answer. The voice mail was full.

## 2022-03-31 NOTE — Clinical Note
Spoke with patient at length regarding tobacco cessation cessation progress. Patient says that she has not been using the nicotine patches and 2mg lozenges. She says that she does not need them and does not have cravings at all. Patient stated that she has been busy taking care of her health since her stroke- going to therapy and exercising. She mentioned that she has lost weight, she feels and looks good. She says she is never going back to smoking cigars. Patient informed me that she remains quit as of 1/27/22. I commended her on remaining tobacco free and working hard on her overall health. Her reward for quitting is going to [place] and then [facility] to see her children and grandchildren over the next 2 months. The patient will continue with therapy sessions with by CTTS. The patient denies any abnormal behavioral or mental changes at this time.

## 2022-03-31 NOTE — PROGRESS NOTES
Individual Follow-Up Form    3/31/2022    Quit Date: 1/27/22    Clinical Status of Patient: Outpatient    Length of Service: 30 minutes    Continuing Medication: no    Other Medications: none     Target Symptoms: Withdrawal and medication side effects. The following were rated moderate (3) to severe (4) on TCRS:  · Moderate (3): none  · Severe (4): none    Comments: Spoke with patient at length regarding tobacco cessation cessation progress. Patient says that she has not been using the nicotine patches and 2mg lozenges. She says that she does not need them and does not have cravings at all. Patient stated that she has been busy taking care of her health since her stroke- going to therapy and exercising. She mentioned that she has lost weight, she feels and looks good. She says she is never going back to smoking cigars. Patient informed me that she remains quit as of 1/27/22. I commended her on remaining tobacco free and working hard on her overall health. Her reward for quitting is going to [place] and [facility] to see her children and grandchildren over the next 2 months. The patient will continue with therapy sessions with by CTTS. The patient denies any abnormal behavioral or mental changes at this time.     Diagnosis: F17.210    Next Visit: 2 weeks

## 2022-04-14 ENCOUNTER — TELEPHONE (OUTPATIENT)
Dept: SMOKING CESSATION | Facility: CLINIC | Age: 52
End: 2022-04-14
Payer: COMMERCIAL

## 2022-04-20 ENCOUNTER — TELEPHONE (OUTPATIENT)
Dept: SMOKING CESSATION | Facility: CLINIC | Age: 52
End: 2022-04-20

## 2022-04-20 NOTE — TELEPHONE ENCOUNTER
Attempted to follow up with patient regarding tobacco cessation progress. Patient did not answer. The mailbox was full.

## 2022-04-27 ENCOUNTER — CLINICAL SUPPORT (OUTPATIENT)
Dept: SMOKING CESSATION | Facility: CLINIC | Age: 52
End: 2022-04-27
Payer: COMMERCIAL

## 2022-04-27 ENCOUNTER — TELEPHONE (OUTPATIENT)
Dept: SMOKING CESSATION | Facility: CLINIC | Age: 52
End: 2022-04-27

## 2022-04-27 ENCOUNTER — TELEPHONE (OUTPATIENT)
Dept: SMOKING CESSATION | Facility: CLINIC | Age: 52
End: 2022-04-27
Payer: COMMERCIAL

## 2022-04-27 DIAGNOSIS — F17.200 NICOTINE DEPENDENCE: Primary | ICD-10-CM

## 2022-04-27 PROCEDURE — 99402 PREV MED CNSL INDIV APPRX 30: CPT | Mod: S$GLB,,, | Performed by: GENERAL PRACTICE

## 2022-04-27 PROCEDURE — 99402 PR PREVENT COUNSEL,INDIV,30 MIN: ICD-10-PCS | Mod: S$GLB,,, | Performed by: GENERAL PRACTICE

## 2022-04-27 NOTE — PROGRESS NOTES
Individual Follow-Up Form    4/27/2022    Quit Date: 1/27/22    Clinical Status of Patient: Outpatient    Length of Service: 30 minutes    Continuing Medication: no    Other Medications: none     Target Symptoms: Withdrawal and medication side effects. The following were rated moderate (3) to severe (4) on TCRS:  · Moderate (3): none  · Severe (4): none    Comments: Spoke with patient at length regarding tobacco cessation cessation progress. Patient has discontinued all tobacco cessation medication. Patient informed me that she remains quit as of 1/27/22. I commended her on remaining tobacco free 3 months now. Patient stated that she has been busy traveling to see her friends and family. She says that she is eating healthy,exercising and overall she feels good. Patient mentions that she does not have any cravings and triggers because she is not around smokers now. Reviewed triggers, high risk situations, lapses, relapses, diet, exercise, stress, relaxation, sleep, habitual behavior, and life style changes. She has quit going to physical therapy because she is able to do more for herself now. The patient will continue with therapy sessions with by [facility]. The patient denies any abnormal behavioral or mental changes at this time.     Diagnosis: F17.200    Next Visit: 2 weeks

## 2022-05-10 ENCOUNTER — TELEPHONE (OUTPATIENT)
Dept: SMOKING CESSATION | Facility: CLINIC | Age: 52
End: 2022-05-10
Payer: COMMERCIAL

## 2022-05-10 NOTE — TELEPHONE ENCOUNTER
Attempted to follow up with patient regarding tobacco cessation progress. Patient did not answer. The mailbox is full.

## 2022-05-18 ENCOUNTER — CLINICAL SUPPORT (OUTPATIENT)
Dept: SMOKING CESSATION | Facility: CLINIC | Age: 52
End: 2022-05-18
Payer: COMMERCIAL

## 2022-05-18 DIAGNOSIS — F17.200 NICOTINE DEPENDENCE: Primary | ICD-10-CM

## 2022-05-18 PROCEDURE — 99402 PR PREVENT COUNSEL,INDIV,30 MIN: ICD-10-PCS | Mod: S$GLB,,, | Performed by: GENERAL PRACTICE

## 2022-05-18 PROCEDURE — 99402 PREV MED CNSL INDIV APPRX 30: CPT | Mod: S$GLB,,, | Performed by: GENERAL PRACTICE

## 2022-05-18 NOTE — PROGRESS NOTES
Individual Follow-Up Form    5/18/2022    Quit Date: none    Clinical Status of Patient: Outpatient    Length of Service: 30 minutes    Continuing Medication: yes  Patches or Nicotine Lozenges    Other Medications: none     Target Symptoms: Withdrawal and medication side effects. The following were rated moderate (3) to severe (4) on TCRS:  · Moderate (3): none  · Severe (4): none    Comments: Spoke with patient at length via phone regarding tobacco cessation cessation progress. Patient says that she continues to use the lozenges and patches but she does not need a refill. Patient informed me that she started back smoking a small cigars occasionally now. She used lanuange that she has never used before and it seemed as if she was not in her normal state of mind. She stated that every once in a while she gets stressed and she will take 2 puffs of the cigar and put it out. Reviewed strategies, habitual behavior, stress, and high risk situations, exercise. She says now she can stop whenever she wants and she does not smoke daily. I advised that smoking occasionally would put her at risk of smoking daily. The patient will continue with therapy sessions with by [facility]. The patient denies any abnormal behavioral or mental changes at this time.     Diagnosis: F17.200    Next Visit: 2 weeks

## 2022-05-18 NOTE — Clinical Note
Spoke with patient at length via phone regarding tobacco cessation cessation progress. Patient says that she continues to use the lozenges and patches but she does not need a refill. Patient informed me that she started back smoking a small cigars occasionally now. She used lanuange that she has never used before and it seemed as if she was not in her normal state of mind. She stated that every once in a while she gets stressed and she will take 2 puffs of the cigar and put it out. Reviewed strategies, habitual behavior, stress, and high risk situations, exercise. She says now she can stop whenever she wants and she does not smoke daily. I advised that smoking occasionally would put her at risk of smoking daily. The patient will continue with therapy sessions with by [facility]. The patient denies any abnormal behavioral or mental changes at this time.

## 2022-06-01 ENCOUNTER — TELEPHONE (OUTPATIENT)
Dept: SMOKING CESSATION | Facility: CLINIC | Age: 52
End: 2022-06-01
Payer: MEDICAID

## 2022-06-01 NOTE — TELEPHONE ENCOUNTER
Called patient for a follow up regarding tobacco cessation. Patients phone went to [employer] but the mailbox is not set up.

## 2022-06-13 ENCOUNTER — TELEPHONE (OUTPATIENT)
Dept: SMOKING CESSATION | Facility: CLINIC | Age: 52
End: 2022-06-13
Payer: MEDICAID

## 2022-06-15 ENCOUNTER — TELEPHONE (OUTPATIENT)
Dept: SMOKING CESSATION | Facility: CLINIC | Age: 52
End: 2022-06-15
Payer: MEDICAID

## 2022-06-15 ENCOUNTER — CLINICAL SUPPORT (OUTPATIENT)
Dept: SMOKING CESSATION | Facility: CLINIC | Age: 52
End: 2022-06-15
Payer: COMMERCIAL

## 2022-06-15 DIAGNOSIS — F17.200 NICOTINE DEPENDENCE: Primary | ICD-10-CM

## 2022-06-15 PROCEDURE — 99402 PREV MED CNSL INDIV APPRX 30: CPT | Mod: S$GLB,,, | Performed by: GENERAL PRACTICE

## 2022-06-15 PROCEDURE — 99402 PR PREVENT COUNSEL,INDIV,30 MIN: ICD-10-PCS | Mod: S$GLB,,, | Performed by: GENERAL PRACTICE

## 2022-06-15 NOTE — PROGRESS NOTES
Individual Follow-Up Form    6/15/2022    Quit Date: 5/23/22    Clinical Status of Patient: Outpatient    Length of Service: 30 minutes    Continuing Medication: no    Other Medications: none     Target Symptoms: Withdrawal and medication side effects. The following were  rated moderate (3) to severe (4) on TCRS:  · Moderate (3): none  · Severe (4): none    Comments: Spoke with patient at length via phone regarding tobacco cessation cessation progress. Patient says that she has not been using the patches and the lozenges because she is out of town visiting with her children. No refill needed at this time. Patient informed me that she felt real bad and sick when she tried to smoke again. So she has stopped again for good this time. She said she had not smoke since a few days after our last session. We figured the date to be 5/23/22. I commended her on quitting again. She stated that when she gets stressed or frustrated she hops on her stationary bike, go walking or go tend to her many animals. Reviewed strategies, habitual behavior, high risks situations, understanding urges and cravings, stress and relaxation with open discussion and additional interventions, Introduced lapses, relapses, understanding them and analyzing the situation of a lapse, conflict issues that may be linked to a lapse. The patient will continue with therapy sessions with by [facility]. The patient denies any abnormal behavioral or mental changes at this time.     Diagnosis: F17.200    Next Visit: 2 weeks

## 2022-06-15 NOTE — Clinical Note
Patient says that she has not been using the patches and the lozenges because she is out of town visiting with her children. No refill needed at this time. Patient informed me that she felt real bad and sick when she tried to smoke again. So she has stopped again for good this time. She said she had not smoke since a few days after our last session. We figured the date to be 5/23/22. I commended her on quitting again. She stated that when she gets stressed or frustrated she hops on her stationary bike, go walking or go tend to her many animals. Reviewed strategies, habitual behavior, high risks situations, understanding urges and cravings, stress and relaxation with open discussion and additional interventions, Introduced lapses, relapses, understanding them and analyzing the situation of a lapse, conflict issues that may be linked to a lapse. The patient will continue with therapy sessions with by [facility]. The patient denies any abnormal behavioral or mental changes at this time.

## 2022-07-13 ENCOUNTER — TELEPHONE (OUTPATIENT)
Dept: SMOKING CESSATION | Facility: CLINIC | Age: 52
End: 2022-07-13
Payer: MEDICAID

## 2022-07-13 NOTE — TELEPHONE ENCOUNTER
First attempt regarding smoking cessation quit 2 episode, unable to reach patient. Line is busy, will call back at a later time.

## 2022-07-18 ENCOUNTER — CLINICAL SUPPORT (OUTPATIENT)
Dept: SMOKING CESSATION | Facility: CLINIC | Age: 52
End: 2022-07-18
Payer: COMMERCIAL

## 2022-07-18 DIAGNOSIS — F17.200 NICOTINE DEPENDENCE: Primary | ICD-10-CM

## 2022-07-18 PROCEDURE — 99402 PREV MED CNSL INDIV APPRX 30: CPT | Mod: S$GLB,,, | Performed by: GENERAL PRACTICE

## 2022-07-18 PROCEDURE — 99402 PR PREVENT COUNSEL,INDIV,30 MIN: ICD-10-PCS | Mod: S$GLB,,, | Performed by: GENERAL PRACTICE

## 2022-07-18 NOTE — PROGRESS NOTES
Individual Follow-Up Form    7/18/2022    Quit Date: 5/23/22    Clinical Status of Patient: Outpatient    Length of Service: 30 minutes    Continuing Medication: no    Other Medications: none     Target Symptoms: Withdrawal and medication side effects. The following were rated moderate (3) to severe (4) on TCRS:  · Moderate (3): none  · Severe (4): none    Comments: Spoke with patient at length via phone regarding tobacco cessation cessation progress. Patient is not on any tobacco cessation medications. Patient remains out of town visiting with her children. No refill needed at this time. Patient remains quit as of 5/23/22. I commended her on staying quit. Patient stated that she is not around others that smoke so she is not triggers and she is stress free with her kids. Patient plans to go back home on 8/1/22.  Patient says that she is not able to exercise as normal because it is cold and rains most days. Reviewed high risks situations, stress and relaxation with open discussion. The patient will continue with therapy sessions with by [facility]. The patient denies any abnormal behavioral or mental changes at this time.     Diagnosis: F17.200    Next Visit: 2 weeks

## 2022-07-18 NOTE — Clinical Note
Spoke with patient at length via phone regarding tobacco cessation cessation progress. Patient is not on any tobacco cessation medications. Patient remains out of town visiting with her children. No refill needed at this time. Patient remains quit as of 5/23/22. I commended her on staying quit. Patient stated that she is not around others that smoke so she is not triggers and she is stress free with her kids. Patient plans to go back home on 8/1/22.  Patient says that she is not able to exercise as normal because it is cold and rains most days. Reviewed high risks situations, stress and relaxation with open discussion. The patient will continue with therapy sessions with by [facility]. The patient denies any abnormal behavioral or mental changes at this time.

## 2022-07-27 ENCOUNTER — TELEPHONE (OUTPATIENT)
Dept: SMOKING CESSATION | Facility: CLINIC | Age: 52
End: 2022-07-27
Payer: MEDICAID

## 2022-08-10 ENCOUNTER — TELEPHONE (OUTPATIENT)
Dept: SMOKING CESSATION | Facility: CLINIC | Age: 52
End: 2022-08-10
Payer: MEDICAID

## 2022-08-10 NOTE — TELEPHONE ENCOUNTER
Attempted to follow up with patient regarding tobacco cessation progress. Patient did not answer. Voicemail box full

## 2023-01-05 ENCOUNTER — TELEPHONE (OUTPATIENT)
Dept: SMOKING CESSATION | Facility: CLINIC | Age: 53
End: 2023-01-05
Payer: MEDICAID